# Patient Record
Sex: MALE | Race: WHITE
[De-identification: names, ages, dates, MRNs, and addresses within clinical notes are randomized per-mention and may not be internally consistent; named-entity substitution may affect disease eponyms.]

---

## 2018-11-07 ENCOUNTER — HOSPITAL ENCOUNTER (INPATIENT)
Dept: HOSPITAL 35 - ER | Age: 48
LOS: 2 days | Discharge: HOME | DRG: 918 | End: 2018-11-09
Attending: INTERNAL MEDICINE | Admitting: INTERNAL MEDICINE
Payer: COMMERCIAL

## 2018-11-07 VITALS — WEIGHT: 227.1 LBS | HEIGHT: 72.99 IN | BODY MASS INDEX: 30.1 KG/M2

## 2018-11-07 VITALS — SYSTOLIC BLOOD PRESSURE: 125 MMHG | DIASTOLIC BLOOD PRESSURE: 92 MMHG

## 2018-11-07 VITALS — DIASTOLIC BLOOD PRESSURE: 91 MMHG | SYSTOLIC BLOOD PRESSURE: 138 MMHG

## 2018-11-07 VITALS — SYSTOLIC BLOOD PRESSURE: 130 MMHG | DIASTOLIC BLOOD PRESSURE: 85 MMHG

## 2018-11-07 VITALS — SYSTOLIC BLOOD PRESSURE: 140 MMHG | DIASTOLIC BLOOD PRESSURE: 98 MMHG

## 2018-11-07 DIAGNOSIS — Z79.899: ICD-10-CM

## 2018-11-07 DIAGNOSIS — Z79.82: ICD-10-CM

## 2018-11-07 DIAGNOSIS — Z91.041: ICD-10-CM

## 2018-11-07 DIAGNOSIS — F41.9: ICD-10-CM

## 2018-11-07 DIAGNOSIS — E66.9: ICD-10-CM

## 2018-11-07 DIAGNOSIS — Z91.14: ICD-10-CM

## 2018-11-07 DIAGNOSIS — I10: ICD-10-CM

## 2018-11-07 DIAGNOSIS — F31.9: ICD-10-CM

## 2018-11-07 DIAGNOSIS — Z91.040: ICD-10-CM
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
## 2018-11-07 DIAGNOSIS — G89.29: ICD-10-CM

## 2018-11-07 DIAGNOSIS — M54.9: ICD-10-CM

## 2018-11-07 DIAGNOSIS — F20.9: ICD-10-CM

## 2018-11-07 DIAGNOSIS — F84.5: ICD-10-CM

## 2018-11-07 DIAGNOSIS — Z86.73: ICD-10-CM

## 2018-11-07 DIAGNOSIS — Z91.5: ICD-10-CM

## 2018-11-07 DIAGNOSIS — T42.6X1A: Primary | ICD-10-CM

## 2018-11-07 DIAGNOSIS — G47.00: ICD-10-CM

## 2018-11-07 DIAGNOSIS — Z88.8: ICD-10-CM

## 2018-11-07 DIAGNOSIS — G43.909: ICD-10-CM

## 2018-11-07 LAB
ALBUMIN SERPL-MCNC: 3.7 G/DL (ref 3.4–5)
ALT SERPL-CCNC: 34 U/L (ref 30–65)
ANION GAP SERPL CALC-SCNC: 10 MMOL/L (ref 7–16)
AST SERPL-CCNC: 31 U/L (ref 15–37)
BASOPHILS NFR BLD AUTO: 0.8 % (ref 0–2)
BILIRUB SERPL-MCNC: 0.8 MG/DL
BILIRUB UR-MCNC: NEGATIVE MG/DL
BUN SERPL-MCNC: 11 MG/DL (ref 7–18)
CALCIUM SERPL-MCNC: 8.7 MG/DL (ref 8.5–10.1)
CHLORIDE SERPL-SCNC: 105 MMOL/L (ref 98–107)
CO2 SERPL-SCNC: 27 MMOL/L (ref 21–32)
COLOR UR: YELLOW
CREAT SERPL-MCNC: 1.2 MG/DL (ref 0.7–1.3)
EOSINOPHIL NFR BLD: 2.3 % (ref 0–3)
ERYTHROCYTE [DISTWIDTH] IN BLOOD BY AUTOMATED COUNT: 13.4 % (ref 10.5–14.5)
GLUCOSE SERPL-MCNC: 94 MG/DL (ref 74–106)
GRANULOCYTES NFR BLD MANUAL: 63.3 % (ref 36–66)
HCT VFR BLD CALC: 43.1 % (ref 42–52)
HGB BLD-MCNC: 15.1 GM/DL (ref 14–18)
KETONES UR STRIP-MCNC: NEGATIVE MG/DL
LYMPHOCYTES NFR BLD AUTO: 28.1 % (ref 24–44)
MCH RBC QN AUTO: 32.2 PG (ref 26–34)
MCHC RBC AUTO-ENTMCNC: 35 G/DL (ref 28–37)
MCV RBC: 91.9 FL (ref 80–100)
MONOCYTES NFR BLD: 5.5 % (ref 1–8)
NEUTROPHILS # BLD: 4.3 THOU/UL (ref 1.4–8.2)
PLATELET # BLD: 175 THOU/UL (ref 150–400)
POTASSIUM SERPL-SCNC: 3.6 MMOL/L (ref 3.5–5.1)
PROT SERPL-MCNC: 7.3 G/DL (ref 6.4–8.2)
RBC # BLD AUTO: 4.69 MIL/UL (ref 4.5–6)
RBC # UR STRIP: NEGATIVE /UL
SALICYLATES SERPL-MCNC: < 2.8 MG/DL (ref 2.8–20)
SODIUM SERPL-SCNC: 142 MMOL/L (ref 136–145)
SP GR UR STRIP: <= 1.005 (ref 1–1.03)
TROPONIN I SERPL-MCNC: <0.06 NG/ML (ref ?–0.06)
URINE CLARITY: CLEAR
URINE GLUCOSE-RANDOM*: NEGATIVE
URINE LEUKOCYTES-REFLEX: NEGATIVE
URINE NITRITE-REFLEX: NEGATIVE
URINE PROTEIN (DIPSTICK): NEGATIVE
UROBILINOGEN UR STRIP-ACNC: 0.2 E.U./DL (ref 0.2–1)
WBC # BLD AUTO: 6.8 THOU/UL (ref 4–11)

## 2018-11-07 PROCEDURE — 10879: CPT

## 2018-11-08 VITALS — SYSTOLIC BLOOD PRESSURE: 138 MMHG | DIASTOLIC BLOOD PRESSURE: 82 MMHG

## 2018-11-08 VITALS — SYSTOLIC BLOOD PRESSURE: 163 MMHG | DIASTOLIC BLOOD PRESSURE: 95 MMHG

## 2018-11-08 VITALS — SYSTOLIC BLOOD PRESSURE: 151 MMHG | DIASTOLIC BLOOD PRESSURE: 77 MMHG

## 2018-11-08 VITALS — DIASTOLIC BLOOD PRESSURE: 92 MMHG | SYSTOLIC BLOOD PRESSURE: 164 MMHG

## 2018-11-08 LAB
ANION GAP SERPL CALC-SCNC: 11 MMOL/L (ref 7–16)
BUN SERPL-MCNC: 10 MG/DL (ref 7–18)
CALCIUM SERPL-MCNC: 8 MG/DL (ref 8.5–10.1)
CHLORIDE SERPL-SCNC: 107 MMOL/L (ref 98–107)
CO2 SERPL-SCNC: 27 MMOL/L (ref 21–32)
CREAT SERPL-MCNC: 1.2 MG/DL (ref 0.7–1.3)
GLUCOSE SERPL-MCNC: 73 MG/DL (ref 74–106)
POTASSIUM SERPL-SCNC: 3.5 MMOL/L (ref 3.5–5.1)
SODIUM SERPL-SCNC: 145 MMOL/L (ref 136–145)

## 2018-11-09 VITALS — SYSTOLIC BLOOD PRESSURE: 162 MMHG | DIASTOLIC BLOOD PRESSURE: 84 MMHG

## 2018-11-09 VITALS — DIASTOLIC BLOOD PRESSURE: 84 MMHG | SYSTOLIC BLOOD PRESSURE: 162 MMHG

## 2020-02-26 ENCOUNTER — HOSPITAL ENCOUNTER (EMERGENCY)
Dept: HOSPITAL 35 - ER | Age: 50
Discharge: HOME | End: 2020-02-26
Payer: COMMERCIAL

## 2020-02-26 VITALS — BODY MASS INDEX: 27.83 KG/M2 | WEIGHT: 210.01 LBS | HEIGHT: 73 IN

## 2020-02-26 VITALS — DIASTOLIC BLOOD PRESSURE: 98 MMHG | SYSTOLIC BLOOD PRESSURE: 171 MMHG

## 2020-02-26 DIAGNOSIS — I10: ICD-10-CM
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
## 2020-02-26 DIAGNOSIS — Z91.040: ICD-10-CM

## 2020-02-26 DIAGNOSIS — G89.29: ICD-10-CM

## 2020-02-26 DIAGNOSIS — Z88.6: ICD-10-CM

## 2020-02-26 DIAGNOSIS — F84.5: ICD-10-CM

## 2020-02-26 DIAGNOSIS — M54.9: ICD-10-CM

## 2020-02-26 DIAGNOSIS — T42.6X5A: Primary | ICD-10-CM

## 2020-02-26 DIAGNOSIS — Z86.73: ICD-10-CM

## 2020-02-26 DIAGNOSIS — Z88.8: ICD-10-CM

## 2020-02-26 DIAGNOSIS — F32.9: ICD-10-CM

## 2020-02-26 DIAGNOSIS — G43.909: ICD-10-CM

## 2020-02-26 DIAGNOSIS — F41.9: ICD-10-CM

## 2020-02-26 LAB
ANION GAP SERPL CALC-SCNC: 8 MMOL/L (ref 7–16)
BASOPHILS NFR BLD AUTO: 1 % (ref 0–2)
BILIRUB UR-MCNC: NEGATIVE MG/DL
BUN SERPL-MCNC: 19 MG/DL (ref 7–18)
CALCIUM SERPL-MCNC: 9.3 MG/DL (ref 8.5–10.1)
CHLORIDE SERPL-SCNC: 104 MMOL/L (ref 98–107)
CO2 SERPL-SCNC: 31 MMOL/L (ref 21–32)
COLOR UR: YELLOW
CREAT SERPL-MCNC: 1.2 MG/DL (ref 0.7–1.3)
EOSINOPHIL NFR BLD: 2.7 % (ref 0–3)
ERYTHROCYTE [DISTWIDTH] IN BLOOD BY AUTOMATED COUNT: 13.1 % (ref 10.5–14.5)
GLUCOSE SERPL-MCNC: 98 MG/DL (ref 74–106)
GRANULOCYTES NFR BLD MANUAL: 52.8 % (ref 36–66)
HCT VFR BLD CALC: 47.9 % (ref 42–52)
HGB BLD-MCNC: 16.3 GM/DL (ref 14–18)
KETONES UR STRIP-MCNC: NEGATIVE MG/DL
LYMPHOCYTES NFR BLD AUTO: 37.1 % (ref 24–44)
MAGNESIUM SERPL-MCNC: 2 MG/DL (ref 1.8–2.4)
MCH RBC QN AUTO: 31.3 PG (ref 26–34)
MCHC RBC AUTO-ENTMCNC: 34.1 G/DL (ref 28–37)
MCV RBC: 91.9 FL (ref 80–100)
MONOCYTES NFR BLD: 6.4 % (ref 1–8)
NEUTROPHILS # BLD: 3.5 THOU/UL (ref 1.4–8.2)
PLATELET # BLD: 193 THOU/UL (ref 150–400)
POTASSIUM SERPL-SCNC: 4 MMOL/L (ref 3.5–5.1)
RBC # BLD AUTO: 5.21 MIL/UL (ref 4.5–6)
RBC # UR STRIP: NEGATIVE /UL
SODIUM SERPL-SCNC: 143 MMOL/L (ref 136–145)
SP GR UR STRIP: 1.01 (ref 1–1.03)
TROPONIN I SERPL-MCNC: <0.06 NG/ML (ref ?–0.06)
URINE CLARITY: CLEAR
URINE GLUCOSE-RANDOM*: NEGATIVE
URINE LEUKOCYTES-REFLEX: NEGATIVE
URINE NITRITE-REFLEX: NEGATIVE
URINE PROTEIN (DIPSTICK): NEGATIVE
UROBILINOGEN UR STRIP-ACNC: 0.2 E.U./DL (ref 0.2–1)
WBC # BLD AUTO: 6.6 THOU/UL (ref 4–11)

## 2020-02-28 NOTE — EKG
[facility]
Damari Britt
[place]   [address]                     ELECTROCARDIOGRAM REPORT      
_______________________________________________________________________________
 
Name:       ABDULAZIZ KINGSTON        Room #:                     DEP [facility]#:      [id number]                       Account #:      [id number]  
Admission:  20    Attend Phys:                          
Discharge:  20    Date of Birth:  [birth year]  
                                                          Report #: [id number]
                                                                    [id number]
_______________________________________________________________________________
THIS REPORT FOR:  
 
cc:  ROBBIE - Klaudia family physician/PCP 
     ROBBIE - Klaudia family physician/PCP 
     Jared Aaron MD [facility]
THIS REPORT FOR:   //name//                          
 
                         [facility] ED
                                       
Test Date:    2020               Test Time:    16:52:18
Pat Name:     ABDULAZIZ KINGSTON        Department:   
Patient ID:   [id number]            Room:          
Gender:                               Technician:   On license of [facility]
:          1970               Requested By: Sigifredo Quiroz
Order Number: [id number] MD:   Jared Aaron
                                 Measurements
Intervals                              Axis          
Rate:         46                       P:            54
LA:           182                      QRS:          13
QRSD:         99                       T:            40
QT:           447                                    
QTc:          391                                    
                           Interpretive Statements
Sinus bradycardia
Otherwise normal
Compared to ECG 2018 18:59:31
No significant change was found
Electronically Signed On 2020 9:17:09 CST by Jared Aaron
[email or web address]
 
 
 
 
 
 
 
 
 
 
 
 
 
 
 
  <ELECTRONICALLY SIGNED>
   By: Jared Aaron MD, FAC   
  20     0917
D: 20 1652                           _____________________________________
T: 20 165                           Jared Aaron MD, [facility]     /EPI